# Patient Record
Sex: FEMALE | Race: OTHER | HISPANIC OR LATINO | URBAN - METROPOLITAN AREA
[De-identification: names, ages, dates, MRNs, and addresses within clinical notes are randomized per-mention and may not be internally consistent; named-entity substitution may affect disease eponyms.]

---

## 2017-01-22 ENCOUNTER — EMERGENCY (EMERGENCY)
Facility: HOSPITAL | Age: 57
LOS: 1 days | Discharge: HOME | End: 2017-01-22
Attending: EMERGENCY MEDICINE
Payer: MEDICAID

## 2017-01-22 VITALS
DIASTOLIC BLOOD PRESSURE: 59 MMHG | TEMPERATURE: 99 F | SYSTOLIC BLOOD PRESSURE: 102 MMHG | HEART RATE: 97 BPM | RESPIRATION RATE: 16 BRPM | OXYGEN SATURATION: 99 %

## 2017-01-22 VITALS
HEIGHT: 65 IN | TEMPERATURE: 98 F | HEART RATE: 107 BPM | SYSTOLIC BLOOD PRESSURE: 106 MMHG | RESPIRATION RATE: 16 BRPM | DIASTOLIC BLOOD PRESSURE: 72 MMHG | WEIGHT: 111.99 LBS | OXYGEN SATURATION: 981 %

## 2017-01-22 DIAGNOSIS — J11.1 INFLUENZA DUE TO UNIDENTIFIED INFLUENZA VIRUS WITH OTHER RESPIRATORY MANIFESTATIONS: ICD-10-CM

## 2017-01-22 DIAGNOSIS — N39.0 URINARY TRACT INFECTION, SITE NOT SPECIFIED: ICD-10-CM

## 2017-01-22 LAB
ALBUMIN SERPL ELPH-MCNC: 3.5 G/DL — SIGNIFICANT CHANGE UP (ref 3.5–5)
ALP SERPL-CCNC: 91 U/L — SIGNIFICANT CHANGE UP (ref 40–120)
ALT FLD-CCNC: 18 U/L DA — SIGNIFICANT CHANGE UP (ref 10–60)
ANION GAP SERPL CALC-SCNC: 8 MMOL/L — SIGNIFICANT CHANGE UP (ref 5–17)
APPEARANCE UR: CLEAR — SIGNIFICANT CHANGE UP
AST SERPL-CCNC: 16 U/L — SIGNIFICANT CHANGE UP (ref 10–40)
BASOPHILS # BLD AUTO: 0.1 K/UL — SIGNIFICANT CHANGE UP (ref 0–0.2)
BASOPHILS NFR BLD AUTO: 2.2 % — HIGH (ref 0–2)
BILIRUB SERPL-MCNC: 0.3 MG/DL — SIGNIFICANT CHANGE UP (ref 0.2–1.2)
BILIRUB UR-MCNC: NEGATIVE — SIGNIFICANT CHANGE UP
BUN SERPL-MCNC: 11 MG/DL — SIGNIFICANT CHANGE UP (ref 7–18)
CALCIUM SERPL-MCNC: 8.1 MG/DL — LOW (ref 8.4–10.5)
CHLORIDE SERPL-SCNC: 106 MMOL/L — SIGNIFICANT CHANGE UP (ref 96–108)
CK MB BLD-MCNC: <1.7 % — SIGNIFICANT CHANGE UP (ref 0–3.5)
CK MB CFR SERPL CALC: <1 NG/ML — SIGNIFICANT CHANGE UP (ref 0–3.6)
CK SERPL-CCNC: 59 U/L — SIGNIFICANT CHANGE UP (ref 21–215)
CO2 SERPL-SCNC: 25 MMOL/L — SIGNIFICANT CHANGE UP (ref 22–31)
COLOR SPEC: YELLOW — SIGNIFICANT CHANGE UP
CREAT SERPL-MCNC: 0.9 MG/DL — SIGNIFICANT CHANGE UP (ref 0.5–1.3)
DIFF PNL FLD: ABNORMAL
EOSINOPHIL # BLD AUTO: 0 K/UL — SIGNIFICANT CHANGE UP (ref 0–0.5)
EOSINOPHIL NFR BLD AUTO: 0 % — SIGNIFICANT CHANGE UP (ref 0–6)
GLUCOSE SERPL-MCNC: 100 MG/DL — HIGH (ref 70–99)
GLUCOSE UR QL: NEGATIVE — SIGNIFICANT CHANGE UP
HCT VFR BLD CALC: 38.8 % — SIGNIFICANT CHANGE UP (ref 34.5–45)
HGB BLD-MCNC: 12.7 G/DL — SIGNIFICANT CHANGE UP (ref 11.5–15.5)
KETONES UR-MCNC: NEGATIVE — SIGNIFICANT CHANGE UP
LEUKOCYTE ESTERASE UR-ACNC: ABNORMAL
LYMPHOCYTES # BLD AUTO: 0.5 K/UL — LOW (ref 1–3.3)
LYMPHOCYTES # BLD AUTO: 10 % — LOW (ref 13–44)
MCHC RBC-ENTMCNC: 31.3 PG — SIGNIFICANT CHANGE UP (ref 27–34)
MCHC RBC-ENTMCNC: 32.6 GM/DL — SIGNIFICANT CHANGE UP (ref 32–36)
MCV RBC AUTO: 95.9 FL — SIGNIFICANT CHANGE UP (ref 80–100)
MONOCYTES # BLD AUTO: 0.6 K/UL — SIGNIFICANT CHANGE UP (ref 0–0.9)
MONOCYTES NFR BLD AUTO: 13.1 % — SIGNIFICANT CHANGE UP (ref 2–14)
NEUTROPHILS # BLD AUTO: 3.6 K/UL — SIGNIFICANT CHANGE UP (ref 1.8–7.4)
NEUTROPHILS NFR BLD AUTO: 74.6 % — SIGNIFICANT CHANGE UP (ref 43–77)
NITRITE UR-MCNC: NEGATIVE — SIGNIFICANT CHANGE UP
PH UR: 6 — SIGNIFICANT CHANGE UP (ref 4.8–8)
PLATELET # BLD AUTO: 188 K/UL — SIGNIFICANT CHANGE UP (ref 150–400)
POTASSIUM SERPL-MCNC: 3.8 MMOL/L — SIGNIFICANT CHANGE UP (ref 3.5–5.3)
POTASSIUM SERPL-SCNC: 3.8 MMOL/L — SIGNIFICANT CHANGE UP (ref 3.5–5.3)
PROT SERPL-MCNC: 7.3 G/DL — SIGNIFICANT CHANGE UP (ref 6–8.3)
PROT UR-MCNC: 15
RBC # BLD: 4.05 M/UL — SIGNIFICANT CHANGE UP (ref 3.8–5.2)
RBC # FLD: 11.8 % — SIGNIFICANT CHANGE UP (ref 10.3–14.5)
SODIUM SERPL-SCNC: 139 MMOL/L — SIGNIFICANT CHANGE UP (ref 135–145)
SP GR SPEC: 1.01 — SIGNIFICANT CHANGE UP (ref 1.01–1.02)
TROPONIN I SERPL-MCNC: <0.015 NG/ML — SIGNIFICANT CHANGE UP (ref 0–0.04)
UROBILINOGEN FLD QL: NEGATIVE — SIGNIFICANT CHANGE UP
WBC # BLD: 4.8 K/UL — SIGNIFICANT CHANGE UP (ref 3.8–10.5)
WBC # FLD AUTO: 4.8 K/UL — SIGNIFICANT CHANGE UP (ref 3.8–10.5)

## 2017-01-22 PROCEDURE — 84484 ASSAY OF TROPONIN QUANT: CPT

## 2017-01-22 PROCEDURE — 71020: CPT | Mod: 26

## 2017-01-22 PROCEDURE — 99283 EMERGENCY DEPT VISIT LOW MDM: CPT | Mod: 25

## 2017-01-22 PROCEDURE — 85027 COMPLETE CBC AUTOMATED: CPT

## 2017-01-22 PROCEDURE — 82550 ASSAY OF CK (CPK): CPT

## 2017-01-22 PROCEDURE — 93005 ELECTROCARDIOGRAM TRACING: CPT

## 2017-01-22 PROCEDURE — 82553 CREATINE MB FRACTION: CPT

## 2017-01-22 PROCEDURE — 81001 URINALYSIS AUTO W/SCOPE: CPT

## 2017-01-22 PROCEDURE — 80053 COMPREHEN METABOLIC PANEL: CPT

## 2017-01-22 PROCEDURE — 71046 X-RAY EXAM CHEST 2 VIEWS: CPT

## 2017-01-22 PROCEDURE — 99285 EMERGENCY DEPT VISIT HI MDM: CPT

## 2017-01-22 RX ORDER — IBUPROFEN 200 MG
600 TABLET ORAL ONCE
Qty: 0 | Refills: 0 | Status: COMPLETED | OUTPATIENT
Start: 2017-01-22 | End: 2017-01-22

## 2017-01-22 RX ORDER — CEPHALEXIN 500 MG
1 CAPSULE ORAL
Qty: 21 | Refills: 0 | OUTPATIENT
Start: 2017-01-22 | End: 2017-01-29

## 2017-01-22 RX ADMIN — Medication 600 MILLIGRAM(S): at 14:03

## 2017-01-22 RX ADMIN — Medication 75 MILLIGRAM(S): at 14:04

## 2017-01-22 NOTE — ED PROVIDER NOTE - NS ED MD SCRIBE ATTENDING SCRIBE SECTIONS
PHYSICAL EXAM/HIV/RESULTS/REVIEW OF SYSTEMS/DISPOSITION/HISTORY OF PRESENT ILLNESS/PAST MEDICAL/SURGICAL/SOCIAL HISTORY/VITAL SIGNS( Pullset)

## 2017-01-22 NOTE — ED PROVIDER NOTE - MEDICAL DECISION MAKING DETAILS
57 y/o F pt w/ syncope and flu like Sx. Will get CXR, labs, give IV fluids and get flu swab to evaluate for a flu.   CXR iv fluids labs and flu swab to evaluate for gleu

## 2017-01-22 NOTE — ED PROVIDER NOTE - OBJECTIVE STATEMENT
55 y/o F pt w/ no significant PMHx BIB EMS to the ED c/o 1 syncopal episode at the urgent care onset today. Pt reports nausea, fever, chills, back pain, CP, cough w/ yellow phlegm and dysuria today morning. Pt reports she has been suffering from flu-like sx w/ congestion since 1/13/17. Pt took Zyrtac (10 MG) to relief x2 days but the flu like sx returned. Pt went to the urgent care for evaluation where pt states she got very nauseous and synopsized. Pt reports she is able to tolerate PO intake. Pt denies SOB, palpitations, LE swelling, diaphoresis, dizziness or any other complaints. Allergies to Morphine (Vomiting).

## 2017-01-22 NOTE — ED PROVIDER NOTE - PROGRESS NOTE DETAILS
dasia: feeling "much better" after ivf.  will try to amubulate and if able to walk without asst will dc home on tamiflu for flu like symptoms since friday, in addition keflex for urinalysis c/w uti, and patient with symptoms of burning on urination

## 2022-12-12 NOTE — ED PROVIDER NOTE - PROGRESS NOTE
Pharmacy Note - Admission Medication History    Pertinent Provider Information:    ______________________________________________________________________    Prior To Admission (PTA) med list completed and updated in EMR.       PTA Med List   Medication Sig Note Last Dose     acetaminophen (TYLENOL) 500 MG tablet 1,000 mg by Gastric Tube route every 6 hours as needed for mild pain   prn     amLODIPine (NORVASC) 5 MG tablet Take 5 mg by mouth daily  12/11/2022     aspirin (ASA) 81 MG chewable tablet 1 tablet (81 mg) by Per Feeding Tube route daily 12/11/2022: Not started yet      baclofen (LIORESAL) 10 MG tablet Take 1 tablet (10 mg) by mouth 3 times daily as needed for muscle spasms  12/11/2022 at 1900     bisacodyl (DULCOLAX) 10 MG suppository INSERT 1 SUPPOSTIORY INTO THE RECTUM DAILY AS NEEDED FOR CONSTIPATION.  12/11/2022     chlorhexidine (PERIDEX) 0.12 % solution Swish and spit 15 mLs in mouth daily   12/11/2022     esomeprazole (NEXIUM) 20 MG DR capsule Take 1 capsule via G-tube twice daily (Patient taking differently: 20 mg daily Take 1 capsule via G-tube twice daily)  12/11/2022     ferrous sulfate 220 (44 Fe) MG/5ML ELIX 5 mLs by Enteral route daily  12/11/2022     guaiFENesin-dextromethorphan (ROBITUSSIN DM) 100-10 MG/5ML syrup 10 mLs by Per G Tube route every 4 hours as needed for cough  prn     ipratropium - albuterol 0.5 mg/2.5 mg/3 mL (DUONEB) 0.5-2.5 (3) MG/3ML neb solution Take 1 vial (3 mLs) by nebulization every 4 hours  12/11/2022 at 2100     LIDOCAINE PAIN RELIEF 4 % Patch PLACE 1 PATCH ONTO THE SKIN EVERY 24 HOURS TO PREVENT LIDOCAINE TOXICITY, PATIENT SHOULD BE PATCH FREE FOR 12 HOURS DAILY.  none on     metoprolol tartrate (LOPRESSOR) 25 MG tablet TAKE ONE TABLET BY MOUTH TWICE DAILY   12/11/2022 at x1     neomycin-bacitracin-polymyxin (NEOSPORIN) 5-400-5000 ointment Apply topically 4 times daily 12/11/2022: Around g-tube prn     nitroFURantoin macrocrystal-monohydrate (MACROBID) 100 MG  capsule Take 1 capsule by G-tube route at bedtime.  12/11/2022 at AM     polyethylene glycol (MIRALAX) 17 GM/Dose powder Give 1 capful via G-tube daily as needed for constipation  prn     Polyethylene Glycol 400 (VISINE DRY EYE RELIEF) 1 % SOLN Apply 1 drop to eye every 6 hours as needed (dry eyes)  prn     QUEtiapine (SEROQUEL) 25 MG tablet Give 3 tablets (75 mg) at bedtime for sleep. Ok to repeat after 4 hours and during the day PRN  12/11/2022 at 1900     simvastatin (ZOCOR) 20 MG tablet 1 tablet (20 mg) by Per G Tube route At Bedtime  12/11/2022 at 1900       Information source(s): Family member    Method of interview communication: in-person    Patient was asked about OTC/herbal products specifically.  PTA med list reflects this.    Based on the pharmacist's assessment, the PTA med list information appears reliable    Allergies were reviewed, assessed, and updated with the patient.      Patient does not use any multi-dose medications prior to admission.     Thank you for the opportunity to participate in the care of this patient.      Mena Rodriguez [facility]     12/11/2022     9:39 PM     Improved.

## 2023-09-14 NOTE — ED PROVIDER NOTE - CONTEXT
"Chief Complaint  Pain and Initial Evaluation of the Left Hip and Pain of the Left Knee     Subjective      Qiana Altman presents to [facility] for evaluation of the left lower extremity. The patient reports left lower extremity pain. She locates most of her pain to the knee. She reports pain radiating up to her hip and across her back. She reports her left leg is shorter. Her PCP has MRI's ordered.     Allergies   Allergen Reactions    Shellfish Allergy Anaphylaxis    Moxifloxacin Hcl Unknown - Low Severity    Nitrous Oxide Other (See Comments)    Scopolamine Swelling    Sulfa Antibiotics Unknown - Low Severity    Morphine Rash    Penicillins Rash        Social History     Socioeconomic History    Marital status:    Tobacco Use    Smoking status: Former     Packs/day: 1.00     Years: 5.00     Pack years: 5.00     Types: Cigarettes     Start date: 1980     Quit date: 1985     Years since quittin.7    Smokeless tobacco: Never    Tobacco comments:     QUIT 28 YEARS AGO   Vaping Use    Vaping Use: Never used   Substance and Sexual Activity    Alcohol use: Never    Drug use: Never    Sexual activity: Not Currently     Birth control/protection: Surgical, Post-menopausal, Tubal ligation        I reviewed the patient's chief complaint, history of present illness, review of systems, past medical history, surgical history, family history, social history, medications, and allergy list.     Review of Systems     Constitutional: Denies fevers, chills, weight loss  Cardiovascular: Denies chest pain, shortness of breath  Skin: Denies rashes, acute skin changes  Neurologic: Denies headache, loss of consciousness  MSK: Left lower extremity pain      Vital Signs:   /85   Pulse 68   Ht 157.5 cm (62\")   Wt 64 kg (141 lb)   SpO2 98%   BMI 25.79 kg/m²          Physical Exam  General: Alert. No acute distress    Ortho Exam      Left lower extremity - pain with knee extension. " Pain to the anterior and medial knee with tenderness. Flexion 120 degrees. Positive EHL, FHL, GS and TA. Sensation intact to all 5 nerves of the foot. Positive pulses.     Left knee: L knee  Date/Time: 9/14/2023 9:19 AM  Consent given by: patient  Site marked: site marked  Timeout: Immediately prior to procedure a time out was called to verify the correct patient, procedure, equipment, support staff and site/side marked as required   Supporting Documentation  Indications: pain   Procedure Details  Location: knee - L knee  Needle gauge: 21G.  Medications administered: 5 mL lidocaine 1 %; 40 mg triamcinolone acetonide 40 MG/ML  Patient tolerance: patient tolerated the procedure well with no immediate complications          Imaging Results (Most Recent)       None             Result Review :       XR Knee 1 or 2 View Left    Result Date: 8/23/2023  Narrative: PROCEDURE: XR KNEE 1 OR 2 VW LEFT  COMPARISON: [facility], , XR KNEE 3 VW LEFT, 10/08/2022, 14:17.  INDICATIONS: GENERALIZED LEFT KNEE PAIN, NO KNOWN INJURY  FINDINGS:  No acute bony abnormality.  There is a femoral medullary salma and healed femoral diaphysis fracture.  Mild medial compartment osteoarthritis is noted.  No fluid in the suprapatellar bursa      Impression:    1. No acute abnormality  2. Mild medial compartment osteoarthritis  3. Old femur fracture      GORDON TURNER MD       Electronically Signed and Approved By: GORODN TURNER MD on 8/23/2023 at 8:27             XR Hip With or Without Pelvis 2 - 3 View Left    Result Date: 8/23/2023  Narrative: PROCEDURE: XR HIP W OR WO PELVIS 2-3 VIEW LEFT  COMPARISON: [facility], , XR KNEE 1 OR 2 VW LEFT, 8/23/2023, 8:13.  INDICATIONS: GENERALIZED LEFT HIP PAIN , NO KNOWN INJURY  FINDINGS:  No acute fracture.  No left hip joint space loss or arthritic change.  Femoral head has a normal contour and trabecular pattern.  There is a femoral medullary salma.  The proximal aspect of the  salma extends approximately 3 cm be on the greater trochanter.  No abnormality of the pelvis is demonstrated.  Hip joints are symmetric.  Severe degenerative disease of the lumbar spine noted      Impression:    1. Unremarkable left hip  2. Femoral salma for previous femur fracture fixation  3. Lumbar degenerative disease      GORDON TURNER MD       Electronically Signed and Approved By: GORDON TURNER MD on 8/23/2023 at 8:26                     Assessment and Plan     Diagnoses and all orders for this visit:    1. Primary osteoarthritis of left knee (Primary)        Discussed the treatment plan with the patient.  I reviewed the previous x-rays. Discussed the risks and benefits of a left knee steroid injection. The patient expressed understanding and wished to proceed. She tolerated the injection well. Plan to proceed with MRI. Plan to continue tylenol.     Call or return if worsening symptoms.    Follow Up     6 weeks      Patient was given instructions and counseling regarding her condition or for health maintenance advice. Please see specific information pulled into the AVS if appropriate.     Scribed for Demetrio Ken MD by Kayla Means.  09/14/23   08:53 EDT    I have personally performed the services described in this document as scribed by the above individual and it is both accurate and complete. Demetrio Ken MD 09/14/23    fainted